# Patient Record
Sex: FEMALE | Race: WHITE | NOT HISPANIC OR LATINO | RURAL
[De-identification: names, ages, dates, MRNs, and addresses within clinical notes are randomized per-mention and may not be internally consistent; named-entity substitution may affect disease eponyms.]

---

## 2018-10-17 ENCOUNTER — OFFICE (OUTPATIENT)
Dept: RURAL CLINIC 3 | Facility: CLINIC | Age: 48
End: 2018-10-17
Payer: COMMERCIAL

## 2018-10-17 VITALS
DIASTOLIC BLOOD PRESSURE: 81 MMHG | HEART RATE: 121 BPM | HEIGHT: 66 IN | SYSTOLIC BLOOD PRESSURE: 142 MMHG | WEIGHT: 159 LBS

## 2018-10-17 DIAGNOSIS — F10.10 ALCOHOL ABUSE, UNCOMPLICATED: ICD-10-CM

## 2018-10-17 DIAGNOSIS — R10.10 UPPER ABDOMINAL PAIN, UNSPECIFIED: ICD-10-CM

## 2018-10-17 DIAGNOSIS — R94.5 ABNORMAL RESULTS OF LIVER FUNCTION STUDIES: ICD-10-CM

## 2018-10-17 DIAGNOSIS — R11.2 NAUSEA WITH VOMITING, UNSPECIFIED: ICD-10-CM

## 2018-10-17 DIAGNOSIS — K62.5 HEMORRHAGE OF ANUS AND RECTUM: ICD-10-CM

## 2018-10-17 DIAGNOSIS — R19.7 DIARRHEA, UNSPECIFIED: ICD-10-CM

## 2018-10-17 PROCEDURE — 99203 OFFICE O/P NEW LOW 30 MIN: CPT | Performed by: NURSE PRACTITIONER

## 2018-10-17 RX ORDER — HYOSCYAMINE SULFATE 0.12 MG/1
0.5 TABLET ORAL; SUBLINGUAL
Qty: 60 | Refills: 5 | Status: COMPLETED
Start: 2018-10-17 | End: 2019-02-20

## 2018-10-17 RX ORDER — PANTOPRAZOLE SODIUM 40 MG/1
40 TABLET, DELAYED RELEASE ORAL
Qty: 30 | Refills: 11 | Status: ACTIVE
Start: 2018-10-17

## 2018-12-11 ENCOUNTER — ON CAMPUS - OUTPATIENT (OUTPATIENT)
Dept: URBAN - METROPOLITAN AREA HOSPITAL 77 | Facility: HOSPITAL | Age: 48
End: 2018-12-11
Payer: COMMERCIAL

## 2018-12-11 DIAGNOSIS — R11.2 NAUSEA WITH VOMITING, UNSPECIFIED: ICD-10-CM

## 2018-12-11 DIAGNOSIS — K62.1 RECTAL POLYP: ICD-10-CM

## 2018-12-11 DIAGNOSIS — K62.5 HEMORRHAGE OF ANUS AND RECTUM: ICD-10-CM

## 2018-12-11 DIAGNOSIS — K64.8 OTHER HEMORRHOIDS: ICD-10-CM

## 2018-12-11 PROCEDURE — 45380 COLONOSCOPY AND BIOPSY: CPT | Performed by: INTERNAL MEDICINE

## 2018-12-11 PROCEDURE — 43235 EGD DIAGNOSTIC BRUSH WASH: CPT | Performed by: INTERNAL MEDICINE

## 2019-02-13 ENCOUNTER — HOSPITAL ENCOUNTER (OUTPATIENT)
Dept: OTHER | Facility: HOSPITAL | Age: 49
Discharge: HOME | End: 2019-02-13
Attending: NURSE PRACTITIONER

## 2019-02-20 ENCOUNTER — OFFICE (OUTPATIENT)
Dept: RURAL CLINIC 3 | Facility: CLINIC | Age: 49
End: 2019-02-20
Payer: COMMERCIAL

## 2019-02-20 VITALS
HEIGHT: 66 IN | WEIGHT: 157 LBS | SYSTOLIC BLOOD PRESSURE: 151 MMHG | DIASTOLIC BLOOD PRESSURE: 85 MMHG | HEART RATE: 114 BPM

## 2019-02-20 DIAGNOSIS — F10.10 ALCOHOL ABUSE, UNCOMPLICATED: ICD-10-CM

## 2019-02-20 DIAGNOSIS — K64.8 OTHER HEMORRHOIDS: ICD-10-CM

## 2019-02-20 DIAGNOSIS — R19.7 DIARRHEA, UNSPECIFIED: ICD-10-CM

## 2019-02-20 DIAGNOSIS — R94.5 ABNORMAL RESULTS OF LIVER FUNCTION STUDIES: ICD-10-CM

## 2019-02-20 PROCEDURE — 99214 OFFICE O/P EST MOD 30 MIN: CPT | Performed by: NURSE PRACTITIONER

## 2019-02-20 RX ORDER — COLESTIPOL HYDROCHLORIDE 1 G/1
4 TABLET, FILM COATED ORAL
Qty: 120 | Refills: 11 | Status: ACTIVE
Start: 2019-02-20

## 2019-04-12 ENCOUNTER — HOSPITAL ENCOUNTER (OUTPATIENT)
Dept: OTHER | Facility: HOSPITAL | Age: 49
Discharge: HOME | End: 2019-04-12
Attending: NURSE PRACTITIONER

## 2019-04-17 ENCOUNTER — OFFICE (OUTPATIENT)
Dept: URBAN - METROPOLITAN AREA CLINIC 64 | Facility: CLINIC | Age: 49
End: 2019-04-17
Payer: COMMERCIAL

## 2019-04-17 VITALS — HEIGHT: 66 IN

## 2019-04-17 DIAGNOSIS — K64.8 OTHER HEMORRHOIDS: ICD-10-CM

## 2019-04-17 PROCEDURE — 46221 LIGATION OF HEMORRHOID(S): CPT | Performed by: INTERNAL MEDICINE

## 2019-07-08 ENCOUNTER — OFFICE (OUTPATIENT)
Dept: URBAN - METROPOLITAN AREA CLINIC 64 | Facility: CLINIC | Age: 49
End: 2019-07-08
Payer: COMMERCIAL

## 2019-07-08 VITALS — HEIGHT: 66 IN

## 2019-07-08 DIAGNOSIS — K64.8 OTHER HEMORRHOIDS: ICD-10-CM

## 2019-07-08 PROCEDURE — 46221 LIGATION OF HEMORRHOID(S): CPT | Performed by: INTERNAL MEDICINE

## 2019-12-02 ENCOUNTER — HOSPITAL ENCOUNTER (OUTPATIENT)
Dept: OTHER | Facility: HOSPITAL | Age: 49
Discharge: HOME | End: 2019-12-02

## 2020-06-23 ENCOUNTER — HOSPITAL ENCOUNTER (OUTPATIENT)
Dept: OTHER | Facility: HOSPITAL | Age: 50
Discharge: HOME | End: 2020-06-23

## 2020-11-17 ENCOUNTER — TELEMEDICINE CONVERTED (OUTPATIENT)
Dept: GASTROENTEROLOGY | Facility: CLINIC | Age: 50
End: 2020-11-17
Attending: NURSE PRACTITIONER

## 2021-01-06 ENCOUNTER — HOSPITAL ENCOUNTER (OUTPATIENT)
Dept: OTHER | Facility: HOSPITAL | Age: 51
Discharge: HOME | End: 2021-01-06
Attending: NURSE PRACTITIONER

## 2021-01-20 ENCOUNTER — CONVERSION ENCOUNTER (OUTPATIENT)
Dept: ORTHOPEDIC SURGERY | Facility: CLINIC | Age: 51
End: 2021-01-20

## 2021-01-20 ENCOUNTER — OFFICE VISIT CONVERTED (OUTPATIENT)
Dept: ORTHOPEDIC SURGERY | Facility: CLINIC | Age: 51
End: 2021-01-20
Attending: ORTHOPAEDIC SURGERY

## 2021-02-05 ENCOUNTER — HOSPITAL ENCOUNTER (OUTPATIENT)
Dept: PREADMISSION TESTING | Facility: HOSPITAL | Age: 51
Discharge: HOME | End: 2021-02-05
Attending: INTERNAL MEDICINE

## 2021-02-06 LAB — SARS-COV-2 RNA SPEC QL NAA+PROBE: NOT DETECTED

## 2021-02-10 ENCOUNTER — HOSPITAL ENCOUNTER (OUTPATIENT)
Dept: GASTROENTEROLOGY | Facility: HOSPITAL | Age: 51
Setting detail: HOSPITAL OUTPATIENT SURGERY
Discharge: HOME | End: 2021-02-10
Attending: INTERNAL MEDICINE

## 2021-02-13 ENCOUNTER — HOSPITAL ENCOUNTER (OUTPATIENT)
Dept: PREADMISSION TESTING | Facility: HOSPITAL | Age: 51
Discharge: HOME | End: 2021-02-13
Attending: ORTHOPAEDIC SURGERY

## 2021-02-14 LAB — SARS-COV-2 RNA SPEC QL NAA+PROBE: NOT DETECTED

## 2021-02-18 ENCOUNTER — HOSPITAL ENCOUNTER (OUTPATIENT)
Dept: PERIOP | Facility: HOSPITAL | Age: 51
Setting detail: HOSPITAL OUTPATIENT SURGERY
Discharge: HOME | End: 2021-02-18
Attending: ORTHOPAEDIC SURGERY

## 2021-02-18 LAB
ALBUMIN SERPL-MCNC: 4.3 G/DL (ref 3.5–5)
ALBUMIN/GLOB SERPL: 1.3 {RATIO} (ref 1.4–2.6)
ALP SERPL-CCNC: 195 U/L (ref 42–98)
ALT SERPL-CCNC: 12 U/L (ref 10–40)
ANION GAP SERPL CALC-SCNC: 17 MMOL/L (ref 8–19)
APTT BLD: 25.6 S (ref 22.2–34.2)
AST SERPL-CCNC: 26 U/L (ref 15–50)
BASOPHILS # BLD AUTO: 0.05 10*3/UL (ref 0–0.2)
BASOPHILS NFR BLD AUTO: 0.8 % (ref 0–3)
BILIRUB SERPL-MCNC: 0.56 MG/DL (ref 0.2–1.3)
BUN SERPL-MCNC: 7 MG/DL (ref 5–25)
BUN/CREAT SERPL: 9 {RATIO} (ref 6–20)
CALCIUM SERPL-MCNC: 9.2 MG/DL (ref 8.7–10.4)
CHLORIDE SERPL-SCNC: 102 MMOL/L (ref 99–111)
CONV ABS IMM GRAN: 0.02 10*3/UL (ref 0–0.2)
CONV CO2: 21 MMOL/L (ref 22–32)
CONV IMMATURE GRAN: 0.3 % (ref 0–1.8)
CONV TOTAL PROTEIN: 7.6 G/DL (ref 6.3–8.2)
CREAT UR-MCNC: 0.77 MG/DL (ref 0.5–0.9)
DEPRECATED RDW RBC AUTO: 48.2 FL (ref 36.4–46.3)
EOSINOPHIL # BLD AUTO: 0.1 10*3/UL (ref 0–0.7)
EOSINOPHIL # BLD AUTO: 1.5 % (ref 0–7)
ERYTHROCYTE [DISTWIDTH] IN BLOOD BY AUTOMATED COUNT: 18.5 % (ref 11.7–14.4)
GFR SERPLBLD BASED ON 1.73 SQ M-ARVRAT: >60 ML/MIN/{1.73_M2}
GLOBULIN UR ELPH-MCNC: 3.3 G/DL (ref 2–3.5)
GLUCOSE SERPL-MCNC: 100 MG/DL (ref 65–99)
HCT VFR BLD AUTO: 34.2 % (ref 37–47)
HGB BLD-MCNC: 11.1 G/DL (ref 12–16)
LYMPHOCYTES # BLD AUTO: 1.82 10*3/UL (ref 1–5)
LYMPHOCYTES NFR BLD AUTO: 27.6 % (ref 20–45)
MCH RBC QN AUTO: 23.9 PG (ref 27–31)
MCHC RBC AUTO-ENTMCNC: 32.5 G/DL (ref 33–37)
MCV RBC AUTO: 73.7 FL (ref 81–99)
MONOCYTES # BLD AUTO: 0.6 10*3/UL (ref 0.2–1.2)
MONOCYTES NFR BLD AUTO: 9.1 % (ref 3–10)
NEUTROPHILS # BLD AUTO: 4 10*3/UL (ref 2–8)
NEUTROPHILS NFR BLD AUTO: 60.7 % (ref 30–85)
NRBC CBCN: 0 % (ref 0–0.7)
OSMOLALITY SERPL CALC.SUM OF ELEC: 280 MOSM/KG (ref 273–304)
PLATELET # BLD AUTO: 156 10*3/UL (ref 130–400)
PMV BLD AUTO: 9.2 FL (ref 9.4–12.3)
POTASSIUM SERPL-SCNC: 3.7 MMOL/L (ref 3.5–5.3)
RBC # BLD AUTO: 4.64 10*6/UL (ref 4.2–5.4)
SODIUM SERPL-SCNC: 136 MMOL/L (ref 135–147)
WBC # BLD AUTO: 6.59 10*3/UL (ref 4.8–10.8)

## 2021-03-05 ENCOUNTER — OFFICE VISIT CONVERTED (OUTPATIENT)
Dept: ORTHOPEDIC SURGERY | Facility: CLINIC | Age: 51
End: 2021-03-05
Attending: ORTHOPAEDIC SURGERY

## 2021-03-05 ENCOUNTER — CONVERSION ENCOUNTER (OUTPATIENT)
Dept: ORTHOPEDIC SURGERY | Facility: CLINIC | Age: 51
End: 2021-03-05

## 2021-03-24 ENCOUNTER — OFFICE VISIT CONVERTED (OUTPATIENT)
Dept: ORTHOPEDIC SURGERY | Facility: CLINIC | Age: 51
End: 2021-03-24
Attending: ORTHOPAEDIC SURGERY

## 2021-03-24 ENCOUNTER — CONVERSION ENCOUNTER (OUTPATIENT)
Dept: ORTHOPEDIC SURGERY | Facility: CLINIC | Age: 51
End: 2021-03-24

## 2021-04-05 ENCOUNTER — OFFICE VISIT CONVERTED (OUTPATIENT)
Dept: ORTHOPEDIC SURGERY | Facility: CLINIC | Age: 51
End: 2021-04-05

## 2021-05-10 ENCOUNTER — HOSPITAL ENCOUNTER (OUTPATIENT)
Dept: OTHER | Facility: HOSPITAL | Age: 51
Discharge: HOME | End: 2021-05-10
Attending: NURSE PRACTITIONER

## 2021-05-10 NOTE — H&P
History and Physical      Patient Name: Shane Sykes   Patient ID: [id number]   Sex: Female   YOB: 1970    Primary Care Provider: JASON LOZOYA   Referring Provider: JASON LOZOYA    Visit Date: January 20, 2021    Provider: Ben Verduzco MD   Location: [facility]   Location Address: [address] [place] [address]  [id number]   Location Phone: [phone]          Chief Complaint  · Right Shoulder Pain      History Of Present Illness  Shane Sykes is a 50 year old /White female who presents today to [facility].      Patient presents today for an evaluation of right shoulder pain. Patient thought it was originally her picc line causing her pain in her right shoulder. Patient was hospitalized and had fluid drawn off her back. When she was released from the hospital she caught COVID and was immediately hospitalized again. Patient had a fall from a ladder where she had immediate bruising in her shoulder. At that time she didn't feel pain but admits she was suffering from alcoholism and hardly felt pain at all. Patient states that she is currently 110 days sober and has a great support system at home. Patient has a history of cirrhosis.          Past Medical History  Asthma; Bladder Disorder; Hypertension; Left Shoulder Rotator Cuff Rim Rent Tear; Seasonal allergies; Thyroid disorder         Past Surgical History  Appendectomy; Colonoscopy; EGD         Medication List  aspirin 81 mg oral tablet,chewable; levothyroxine 125 mcg oral capsule; lisinopril 10 mg oral tablet; potassium chloride 20 mEq oral tablet extended release; tizanidine 4 mg oral tablet         Allergy List  NO KNOWN DRUG ALLERGIES       Allergies Reconciled  Family Medical History  - No Family History of Colorectal Cancer; *No Known Family History         Social History  Alcohol (Former); ; lives with children; Recreational Drug Use (Never); Tobacco (Current every day);  "Unemployed         Review of Systems  · Constitutional  o Denies  o : fever, chills, weight loss  · Cardiovascular  o Denies  o : chest pain, shortness of breath  · Gastrointestinal  o Denies  o : liver disease, heartburn, nausea, blood in stools  · Genitourinary  o Denies  o : painful urination, blood in urine  · Integument  o Denies  o : rash, itching  · Neurologic  o Denies  o : headache, weakness, loss of consciousness  · Musculoskeletal  o Denies  o : painful, swollen joints  · Psychiatric  o Denies  o : drug/alcohol addiction, anxiety, depression      Vitals  Date Time BP Position Site L\R Cuff Size HR RR TEMP (F) WT  HT  BMI kg/m2 BSA m2 O2 Sat FR L/min FiO2 HC       01/20/2021 02:10 PM      80 - R   140lbs 2oz 5'  6\" 22.62 1.72 100 %            Physical Examination  · Constitutional  o Appearance  o : well developed, well-nourished, no obvious deformities present  · Head and Face  o Head  o :   § Inspection  § : normocephalic  o Face  o :   § Inspection  § : no facial lesions  · Eyes  o Conjunctivae  o : conjunctivae normal  o Sclerae  o : sclerae white  · Ears, Nose, Mouth and Throat  o Ears  o :   § External Ears  § : appearance within normal limits  § Hearing  § : intact  o Nose  o :   § External Nose  § : appearance normal  · Neck  o Inspection/Palpation  o : normal appearance  o Range of Motion  o : full range of motion  · Respiratory  o Respiratory Effort  o : breathing unlabored  o Inspection of Chest  o : normal appearance  o Auscultation of Lungs  o : no audible wheezing or rales  · Cardiovascular  o Heart  o : regular rate  · Gastrointestinal  o Abdominal Examination  o : soft and non-tender  · Skin and Subcutaneous Tissue  o General Inspection  o : intact, no rashes  · Psychiatric  o General  o : Alert and oriented x3  o Judgement and Insight  o : judgment and insight intact  o Mood and Affect  o : mood normal, affect appropriate  · Right Shoulder  o Inspection  o : Sensation grossly intact. " Neurovascular intact. Skin intact. Forward flexion to 120 with pain. Pain with empty can testing. No swelling, skin discoloration or atrophy. Tender supraspinatus tendon. Tender biceps tendon. Good tone of deltoid, biceps, triceps, wrist extensors, and wrist flexors. Pain with cross body adduction. Pain with supination or pronation. Good flexion and extension of the elbow. IR to back pocket. Limited range of motion secondary to pain.   · Imaging  o Imaging  o : 1/6/21 MRI: 1. There is a full-thickness tear involving the distal middle fibers of the supraspinatus component of the cuff. There is partial retraction of the torn fibers by approximately 1.3 cm. 2. There is also evidence of partial thickness undersurface tear involving the remaining distal anterior and posterior fibers of the supraspinatus component. 3. No evidence for biceps tendon tear or labral tear.           Assessment  · Right shoulder pain, unspecified chronicity     719.41/M25.511  · Rotator cuff tear of right shoulder     840.4/M75.100      Plan  · Medications  o Medications have been Reconciled  o Transition of Care or Provider Policy  · Instructions  o Dr. Verduzco saw and examined the patient and agrees with plan.   o MRI reviewed by Dr. Verduzco.  o Reviewed the patient's Past Medical, Social, and Family history as well as the ROS at today's visit, no changes.  o Call or return if worsening symptoms.  o Discussed surgery.  o Risks/benefits discussed with patient including, but not limited to: infection, bleeding, neurovascular damage, malunion, nonunion, aesthetic deformity, need for further surgery, and death.  o Surgery pamphlet given.  o This note was transcribed by Fadia Case. marce  o Educated on risk of smoking related to procedure. Discussed options for smoking cessation.  o Discussed diagnosis and treatment options with the patient. Patient wishes to proceed with right shoulder scope. She states doesn't have any addiction issues with pain  medication or anything else, her only issue was alcohol which she is staying sober from. She has a great support system at home. Patient states she can't take Ibuprofen per her gastro doctor.            Electronically Signed by: Fadia Case-, Other -Author on January 21, 2021 03:28:07 PM  Electronically Co-signed by: Ben Verduzco MD -Reviewer on January 24, 2021 07:36:06 PM

## 2021-05-10 NOTE — H&P
"   History and Physical      Patient Name: Shane Sykes   Patient ID: [id number]   Sex: Female   YOB: 1970    Primary Care Provider: JASON LOZOYA   Referring Provider: JASON LOZOYA    Visit Date: November 17, 2020    Provider: DEVORA Gonzalez   [facility]   Location Address: [address] [place], [place] [address]  [id number]   Location Phone: [phone]          Chief Complaint  · Cirrhosis      History Of Present Illness  Video Conferencing Visit  Shane Sykes is a 50 year old /White female who is presenting for evaluation via video conferencing via [employer]. Verbal consent obtained before beginning visit.   The following staff were present during this visit: Mansi Arana MA   The patient is a 50 year old /White female, who presents on referral from JASON LOZOYA, for a gastroenterology evaluation for cirrhosis.      50-year-old female agrees to telehealth visit due to testing positive for COVID on 10/20/2020.  The patient used to drink a significant amount of alcohol.  She was drinking anywhere from 1 to 2 pints of whiskey per day.  She has been sober for 45 days.  Last month, she had a lot of bloating and swelling in her stomach.  She reports that she was \"talking crazy out of my head\" and her daughter thought she had a stroke.  She was taken to [facility], and then she was transferred to [facility] in [place].  At [facility], it was discovered that she had cirrhosis.  She reports she had 11 L drained over 5 paracentesis while in the hospital.  She was in the hospital for over a month.  She had lost 40 pounds during her stay.  She reports her ammonia levels were extremely high while in the hospital.  Upon discharge, she reports she is not had any abdominal swelling, pedal edema, or changes in mental cognition.  She is taking Aldactone 50 mg daily.  Her last colonoscopy she reports was in 2018.  She " does have a history of colon polyps, but there were none found on her last colonoscopy.  She has not had a recent EGD.  She denies melena hematemesis.       Labs 06/09/2020: AST 83, ALT 47, alk phos 379, creatinine 0.66, GFR greater than 60         Past Medical History  Asthma; Bladder Disorder; Hypertension; Left Shoulder Rotator Cuff Rim Rent Tear; Seasonal allergies; Thyroid disorder         Past Surgical History  Appendectomy; Colonoscopy; EGD         Medication List  Aldactone 50 mg oral tablet; aspirin 81 mg oral tablet,chewable; midodrine 10 mg oral tablet; Synthroid 150 mcg oral tablet; tizanidine 4 mg oral tablet         Allergy List  NO KNOWN DRUG ALLERGIES       Allergies Reconciled  Family Medical History  - No Family History of Colorectal Cancer; *No Known Family History         Social History  Alcohol (Former); ; lives with children; Recreational Drug Use (Never); Tobacco (Current every day); Unemployed         Review of Systems  · Constitutional  o Denies  o : chills, fever  · Eyes  o Denies  o : blurred vision, changes in vision  · Cardiovascular  o Denies  o : chest pain  · Respiratory  o Denies  o : shortness of breath  · Gastrointestinal  o Denies  o : nausea, vomiting  · Genitourinary  o Denies  o : dysuria, blood in urine  · Integument  o Denies  o : rash  · Neurologic  o Denies  o : tingling or numbness  · Musculoskeletal  o Denies  o : joint pain  · Endocrine  o Denies  o : weight gain, weight loss  · Psychiatric  o Denies  o : anxiety, depression      Physical Examination  · Constitutional  o Appearance  o : Well-nourished, well developed, alert, in no acute distress, alert and oriented X 3.  · Eyes  o Vision  o :   § Visual Fields  § : eyes move symmetrical in all directions  o Sclerae  o : sclerae anicteric  o Pupils and Irises  o : pupils equal and symetrical  · Neck  o Inspection/Palpation  o : Trachea is midline, no adneopathy  o Thyroid  o : Thyroid is not  enlarged  · Respiratory  o Respiratory Effort  o : Breathing is unlabored.  o Inspection of Chest  o : normal appearance, no retractions  · Genitourinary  o Digital Rectal Examination  o : Deferred  · Skin and Subcutaneous Tissue  o General Inspection  o : Skin is without focal lesions.   · Psychiatric  o Mood and Affect  o : No obvious depression.          Assessment  · Cirrhosis     571.5/K74.60      Plan  · Orders  o CBC with Auto Diff [facility] (64030) - 571.5/K74.60 - 11/17/2020  o CMP [facility] ([id number]) - 571.5/K74.60 - 11/17/2020  o Consent for Esophagogastroduodenoscopy (EGD) - Possible risks/complications, benefits, and alternatives to surgical or invasive procedure have been explained to patient and/or legal guardian. - Patient has been evaluated and can tolerate anesthesia and/or sedation. Risks, benefits, and alternatives to anesthesia and sedation have been explained to patient and/or legal guardian. (24556) - 571.5/K74.60 - 11/17/2020  o PT/INR (39400) - 571.5/K74.60 - 11/17/2020  o Alpha fetoprotein test (79214, 19560) - 571.5/K74.60 - 11/17/2020  o Ammonia level (43926) - 571.5/K74.60 - 11/17/2020  · Medications  o Medications have been Reconciled  o Transition of Care or Provider Policy  · Instructions  o 50-year-old female presenting today for a telehealth visit to establish a GI doctor. She has recently been diagnosed with cirrhosis. She has been doing quite well since her discharge from the hospital. She denies abdominal swelling, pedal edema, and changes in mental cognition. She is currently taking Aldactone 50 mg daily. I am going to order CBC, CMP, PT/INR, alpha-fetoprotein, and ammonia level. I will have her follow-up in 6 months to repeat those labs and a right upper quadrant ultrasound. We are going to get the patient scheduled for an EGD for surveillance of esophageal varices. The patient is agreeable to the plan.  o Electronically Identified Patient Education Materials Provided  Electronically            Electronically Signed by: DEVORA Gonzalez -Author on November 17, 2020 02:09:09 PM  Electronically Co-signed by: Mg Kumar MD -Reviewer on December 1, 2020 06:40:57 PM

## 2021-05-14 VITALS — BODY MASS INDEX: 21.57 KG/M2 | OXYGEN SATURATION: 99 % | HEART RATE: 109 BPM | HEIGHT: 66 IN | WEIGHT: 134.25 LBS

## 2021-05-14 VITALS — HEART RATE: 84 BPM | WEIGHT: 140 LBS | OXYGEN SATURATION: 97 % | BODY MASS INDEX: 22.5 KG/M2 | HEIGHT: 66 IN

## 2021-05-14 VITALS — HEIGHT: 66 IN | BODY MASS INDEX: 22.9 KG/M2 | OXYGEN SATURATION: 99 % | HEART RATE: 99 BPM | WEIGHT: 142.5 LBS

## 2021-05-14 VITALS — WEIGHT: 140.12 LBS | OXYGEN SATURATION: 100 % | BODY MASS INDEX: 22.52 KG/M2 | HEART RATE: 80 BPM | HEIGHT: 66 IN

## 2021-05-14 NOTE — PROGRESS NOTES
Progress Note      Patient Name: Shane Sykes   Patient ID: [id number]   Sex: Female   YOB: 1970    Primary Care Provider: JASON LOZOYA   Referring Provider: JASON LOZOYA    Visit Date: April 5, 2021    Provider: Bubba Farris PA-C   Location: [facility]   Location Address: [address] [place] [address]  [id number]   Location Phone: [phone]          Chief Complaint  · Right shoulder pain       History Of Present Illness  Shane Sykes is a 50 year old /White female who presents today to [facility].      Patient follows up today for right shoulder arthroscopy with mini open rotator cuff repair distal clavicle resection, and subacromial decompression performed 2/18/2021 by Dr. Kidd.  Patient reports pain has improved since the time of surgery.  Patient has been adherent to interventions and instructions.       Past Medical History  Asthma; Bladder disorder; Hypertension; Left Shoulder Rotator Cuff Rim Rent Tear; Seasonal allergies; Thyroid disorder         Past Surgical History  Appendectomy; Colonoscopy; EGD         Medication List  aspirin 81 mg oral tablet,chewable; levothyroxine 125 mcg oral capsule; lisinopril 10 mg oral tablet; Percocet 5-325 mg oral tablet; potassium chloride 20 mEq oral tablet extended release; tizanidine 4 mg oral tablet; Voltaren 1 % topical gel         Allergy List  NO KNOWN DRUG ALLERGIES         Family Medical History  - No Family History of Colorectal Cancer; *No Known Family History         Social History  Alcohol (Former); ; lives with children; Recreational Drug Use (Never); Tobacco (Current every day); Unemployed         Review of Systems  · Constitutional  o Denies  o : fever, chills, weight loss  · Cardiovascular  o Denies  o : chest pain, shortness of breath  · Gastrointestinal  o Denies  o : liver disease, heartburn, nausea, blood in stools  · Genitourinary  o Denies  o : painful  "urination, blood in urine  · Integument  o Denies  o : rash, itching  · Neurologic  o Denies  o : headache, weakness, loss of consciousness  · Musculoskeletal  o Denies  o : painful, swollen joints  · Psychiatric  o Denies  o : drug/alcohol addiction, anxiety, depression      Vitals  Date Time BP Position Site L\R Cuff Size HR RR TEMP (F) WT  HT  BMI kg/m2 BSA m2 O2 Sat FR L/min FiO2        04/05/2021 01:29 PM      109 - R   134lbs 4oz 5'  6\" 21.67 1.68 99 %            Physical Examination  · Constitutional  o Appearance  o : well developed, well-nourished, no obvious deformities present  · Head and Face  o Head  o :   § Inspection  § : normocephalic  o Face  o :   § Inspection  § : no facial lesions  · Eyes  o Conjunctivae  o : conjunctivae normal  o Sclerae  o : sclerae white  · Ears, Nose, Mouth and Throat  o Ears  o :   § External Ears  § : appearance within normal limits  § Hearing  § : intact  o Nose  o :   § External Nose  § : appearance normal  · Neck  o Inspection/Palpation  o : normal appearance  o Range of Motion  o : full range of motion  · Respiratory  o Respiratory Effort  o : breathing unlabored  o Inspection of Chest  o : normal appearance  o Auscultation of Lungs  o : no audible wheezing or rales  · Cardiovascular  o Heart  o : regular rate  · Gastrointestinal  o Abdominal Examination  o : soft and non-tender  · Skin and Subcutaneous Tissue  o General Inspection  o : intact, no rashes  · Psychiatric  o General  o : Alert and oriented x3  o Judgement and Insight  o : judgment and insight intact  o Mood and Affect  o : mood normal, affect appropriate  · Right Shoulder  o Inspection  o : Surgical sites are appreciated to be healing appropriately with good scar formation that is supple. Pain limited P ROM compared to contralateral side. Neurovascularly intact distally.          Assessment  · Right shoulder pain, unspecified chronicity     719.41/M25.511  · Surgical aftercare, musculoskeletal " system     V58.78/Z47.89  · Decreased range of motion of shoulder, right     719.51/M25.611      Plan  · Medications  o Medications have been Reconciled  o Transition of Care or Provider Policy  · Instructions  o Reviewed the patient's Past Medical, Social, and Family history as well as the ROS at today's visit, no changes.  o Call or return if worsening symptoms.  o Patient is 6 weeks post op. Patient educated to focus on ROM, no heavy lifting or push/pull, and is to discontinue sling and bolster. Patient to follow up in 6 weeks.  o Portions of this note were generated with voice recognition software. While efforts have been made to proofread the text, some sound alike errors may still persist.             Electronically Signed by: Bubba Farris PA-C -Author on April 11, 2021 12:01:18 PM

## 2021-05-14 NOTE — PROGRESS NOTES
Progress Note      Patient Name: Shane Sykes   Patient ID: [id number]   Sex: Female   YOB: 1970    Primary Care Provider: JASON LOZOYA   Referring Provider: JASON LOZOYA    Visit Date: March 24, 2021    Provider: Ben Verduzco MD   Location: [facility]   Location Address: [address] [place] [address]  [id number]   Location Phone: [phone]          Chief Complaint  · Right Shoulder Pain      History Of Present Illness  Shane Sykes is a 50 year old /White female who presents today to [facility].      Patient is s/p Right Shoulder Arthroscopy, Subacromial Decompression, Distal Claviculectomy, Rotator Cuff Repair performed on 2/18/21. She just began physical therapy yesterday and states they are doing well for the one time she has gone. She states pain due to stiffness today. She presents today in a sling.       Past Medical History  Asthma; Bladder disorder; Hypertension; Left Shoulder Rotator Cuff Rim Rent Tear; Seasonal allergies; Thyroid disorder         Past Surgical History  Appendectomy; Colonoscopy; EGD         Medication List  aspirin 81 mg oral tablet,chewable; levothyroxine 125 mcg oral capsule; lisinopril 10 mg oral tablet; Norco 7.5-325 mg oral tablet; potassium chloride 20 mEq oral tablet extended release; tizanidine 4 mg oral tablet         Allergy List  NO KNOWN DRUG ALLERGIES       Allergies Reconciled  Family Medical History  - No Family History of Colorectal Cancer; *No Known Family History         Social History  Alcohol (Former); ; lives with children; Recreational Drug Use (Never); Tobacco (Current every day); Unemployed         Review of Systems  · Constitutional  o Denies  o : fever, chills, weight loss  · Cardiovascular  o Denies  o : chest pain, shortness of breath  · Gastrointestinal  o Denies  o : liver disease, heartburn, nausea, blood in stools  · Genitourinary  o Denies  o : painful urination,  "blood in urine  · Integument  o Denies  o : rash, itching  · Neurologic  o Denies  o : headache, weakness, loss of consciousness  · Musculoskeletal  o Denies  o : painful, swollen joints  · Psychiatric  o Denies  o : drug/alcohol addiction, anxiety, depression      Vitals  Date Time BP Position Site L\R Cuff Size HR RR TEMP (F) WT  HT  BMI kg/m2 BSA m2 O2 Sat FR L/min FiO2        03/24/2021 10:07 AM      99 - R   142lbs 8oz 5'  6\" 23 1.73 99 %            Physical Examination  · Constitutional  o Appearance  o : well developed, well-nourished, no obvious deformities present  · Head and Face  o Head  o :   § Inspection  § : normocephalic  o Face  o :   § Inspection  § : no facial lesions  · Eyes  o Conjunctivae  o : conjunctivae normal  o Sclerae  o : sclerae white  · Ears, Nose, Mouth and Throat  o Ears  o :   § External Ears  § : appearance within normal limits  § Hearing  § : intact  o Nose  o :   § External Nose  § : appearance normal  · Neck  o Inspection/Palpation  o : normal appearance  o Range of Motion  o : full range of motion  · Respiratory  o Respiratory Effort  o : breathing unlabored  o Inspection of Chest  o : normal appearance  o Auscultation of Lungs  o : no audible wheezing or rales  · Cardiovascular  o Heart  o : regular rate  · Gastrointestinal  o Abdominal Examination  o : soft and non-tender  · Skin and Subcutaneous Tissue  o General Inspection  o : intact, no rashes  · Psychiatric  o General  o : Alert and oriented x3  o Judgement and Insight  o : judgment and insight intact  o Mood and Affect  o : mood normal, affect appropriate  · Right Shoulder  o Inspection  o : Sensation grossly intact. Neurovascular intact. Mild redness around incision. Incisions well healing with no signs of infection. Skin intact. Tenderness around incision. Pulses are pleasant.           Assessment  · Aftercare following Right Shoulder Arthroscopy, Subacromial Decompression, Distal Claviculectomy, Rotator Cuff " Repair     V54.81  · Left shoulder pain, unspecified chronicity     719.41/M25.512      Plan  · Medications  o Medications have been Reconciled  o Transition of Care or Provider Policy  · Instructions  o Dr. Verduzco saw and examined the patient and agrees with plan.   o Reviewed the patient's Past Medical, Social, and Family history as well as the ROS at today's visit, no changes.  o Call or return if worsening symptoms.  o Follow up in 1 month.  o Discussed treatment plans with the patient. She will continue physical therapy and doing at home exercises that we demonstrated in todays visit. She will being to wean herself out the sling.  o The above service was scribed by Fadia Case on my behalf and I attest to the accuracy of the note. marce             Electronically Signed by: Fadia Case-, Other -Author on March 25, 2021 09:31:53 AM  Electronically Co-signed by: Ben Verduzco MD -Reviewer on March 28, 2021 01:40:18 PM

## 2021-05-14 NOTE — PROGRESS NOTES
Progress Note      Patient Name: Shane Sykes   Patient ID: [id number]   Sex: Female   YOB: 1970    Primary Care Provider: JASON LOZOYA   Referring Provider: JASON LOZOYA    Visit Date: March 5, 2021    Provider: Ben Verduzco MD   Location: [facility]   Location Address: [address] [place] [address]  [id number]   Location Phone: [phone]          Chief Complaint  · Right Shoulder Arthroscopy      History Of Present Illness  Shane Sykes is a 50 year old /White female who presents today to [facility]. Patient is following up for her right rotator cuff repair. She is doing pretty well. She is two weeks out.       Past Medical History  Asthma; Bladder disorder; Hypertension; Left Shoulder Rotator Cuff Rim Rent Tear; Seasonal allergies; Thyroid disorder         Past Surgical History  Appendectomy; Colonoscopy; EGD         Medication List  aspirin 81 mg oral tablet,chewable; levothyroxine 125 mcg oral capsule; lisinopril 10 mg oral tablet; Norco 7.5-325 mg oral tablet; potassium chloride 20 mEq oral tablet extended release; tizanidine 4 mg oral tablet         Allergy List  NO KNOWN DRUG ALLERGIES         Family Medical History  - No Family History of Colorectal Cancer; *No Known Family History         Social History  Alcohol (Former); ; lives with children; Recreational Drug Use (Never); Tobacco (Current every day); Unemployed         Review of Systems  · Constitutional  o Denies  o : fever, chills, weight loss  · Cardiovascular  o Denies  o : chest pain, shortness of breath  · Gastrointestinal  o Denies  o : liver disease, heartburn, nausea, blood in stools  · Genitourinary  o Denies  o : painful urination, blood in urine  · Integument  o Denies  o : rash, itching  · Neurologic  o Denies  o : headache, weakness, loss of consciousness  · Musculoskeletal  o Denies  o : painful, swollen joints  · Psychiatric  o Denies  o :  "drug/alcohol addiction, anxiety, depression      Vitals  Date Time BP Position Site L\R Cuff Size HR RR TEMP (F) WT  HT  BMI kg/m2 BSA m2 O2 Sat FR L/min FiO2 HC       03/05/2021 03:19 PM      84 - R   140lbs 0oz 5'  6\" 22.6 1.72 97 %            Physical Examination  · Constitutional  o Appearance  o : well developed, well-nourished, no obvious deformities present  · Head and Face  o Head  o :   § Inspection  § : normocephalic  o Face  o :   § Inspection  § : no facial lesions  · Eyes  o Conjunctivae  o : conjunctivae normal  o Sclerae  o : sclerae white  · Ears, Nose, Mouth and Throat  o Ears  o :   § External Ears  § : appearance within normal limits  § Hearing  § : intact  o Nose  o :   § External Nose  § : appearance normal  · Neck  o Inspection/Palpation  o : normal appearance  o Range of Motion  o : full range of motion  · Respiratory  o Respiratory Effort  o : breathing unlabored  o Inspection of Chest  o : normal appearance  o Auscultation of Lungs  o : no audible wheezing or rales  · Cardiovascular  o Heart  o : regular rate  · Gastrointestinal  o Abdominal Examination  o : soft and non-tender  · Skin and Subcutaneous Tissue  o General Inspection  o : intact, no rashes  · Psychiatric  o General  o : Alert and oriented x3  o Judgement and Insight  o : judgment and insight intact  o Mood and Affect  o : mood normal, affect appropriate  · Right Shoulder  o Inspection  o : Incisions well-healed. No signs of infection. She has forward flexion to about 90 degrees passively. Neurovascularly intact upper extremity.           Assessment  · Right shoulder rotator cuff repair-Aftercare following surgery of the muskuloskeletal system     V54.81  · Right shoulder pain, unspecified chronicity     719.41/M25.511      Plan  · Medications  o Medications have been Reconciled  o Transition of Care or Provider Policy  · Instructions  o Reviewed the patient's Past Medical, Social, and Family history as well as the ROS at " today's visit, no changes.  o Call or return if worsening symptoms.  o This note is transcribed by Torri zheng  o Start therapy in 2 weeks. Give her some more passive range of motion exercises to do.             Electronically Signed by: Torri Berg, -Author on March 9, 2021 11:10:48 AM  Electronically Co-signed by: Ben Verduzco MD -Reviewer on March 14, 2021 05:53:31 PM

## 2021-05-17 ENCOUNTER — OFFICE VISIT CONVERTED (OUTPATIENT)
Dept: ORTHOPEDIC SURGERY | Facility: CLINIC | Age: 51
End: 2021-05-17
Attending: PHYSICIAN ASSISTANT

## 2021-06-05 NOTE — PROGRESS NOTES
Progress Note      Patient Name: Shane Sykes   Patient ID: [id number]   Sex: Female   YOB: 1970    Primary Care Provider: JASON LOZOYA   Referring Provider: JASON LOZOYA    Visit Date: May 17, 2021    Provider: Bubba Farris PA-C   Location: [facility]   Location Address: [address] [place] [address]  [id number]   Location Phone: [phone]          Chief Complaint  · Follow up right shoulder arthroscopy      History Of Present Illness  Shane Sykes is a 50 year old /White female who presents today to [facility].      Patient follows up today for right shoulder arthroscopy with mini open RCR, distal clavicle resection, subacromial decompression performed 2/18/2021 by Dr. Verduzco.  Patient reports pain has improved since the time of surgery.  Patient has been adherent to interventions and instructions.    Patient reports that she anticipates moving shortly to [place].       Past Medical History  Asthma; Bladder disorder; Hypertension; Left Shoulder Rotator Cuff Rim Rent Tear; Seasonal allergies; Thyroid disorder         Past Surgical History  Appendectomy; Colonoscopy; EGD         Medication List  aspirin 81 mg oral tablet,chewable; levothyroxine 125 mcg oral capsule; lisinopril 10 mg oral tablet; meloxicam 7.5 mg oral tablet; potassium chloride 20 mEq oral tablet extended release; tizanidine 4 mg oral tablet; Ultram 50 mg oral tablet; Voltaren 1 % topical gel         Allergy List  NO KNOWN DRUG ALLERGIES         Family Medical History  - No Family History of Colorectal Cancer; *No Known Family History         Social History  Alcohol (Former); ; lives with children; Recreational Drug Use (Never); Tobacco (Current every day); Unemployed         Review of Systems  · Constitutional  o Denies  o : fever, chills, weight loss  · Cardiovascular  o Denies  o : chest pain, shortness of breath  · Gastrointestinal  o Denies  o : liver  "disease, heartburn, nausea, blood in stools  · Genitourinary  o Denies  o : painful urination, blood in urine  · Integument  o Denies  o : rash, itching  · Neurologic  o Denies  o : headache, weakness, loss of consciousness  · Musculoskeletal  o Denies  o : painful, swollen joints  · Psychiatric  o Denies  o : drug/alcohol addiction, anxiety, depression      Vitals  Date Time BP Position Site L\R Cuff Size HR RR TEMP (F) WT  HT  BMI kg/m2 BSA m2 O2 Sat FR L/min FiO2        05/17/2021 01:43 PM      96 - R   135lbs 0oz 5'  6\" 21.79 1.69 98 %            Physical Examination  · Constitutional  o Appearance  o : well developed, well-nourished, no obvious deformities present  · Head and Face  o Head  o :   § Inspection  § : normocephalic  o Face  o :   § Inspection  § : no facial lesions  · Eyes  o Conjunctivae  o : conjunctivae normal  o Sclerae  o : sclerae white  · Ears, Nose, Mouth and Throat  o Ears  o :   § External Ears  § : appearance within normal limits  § Hearing  § : intact  o Nose  o :   § External Nose  § : appearance normal  · Neck  o Inspection/Palpation  o : normal appearance  o Range of Motion  o : full range of motion  · Respiratory  o Respiratory Effort  o : breathing unlabored  o Inspection of Chest  o : normal appearance  o Auscultation of Lungs  o : no audible wheezing or rales  · Cardiovascular  o Heart  o : regular rate  · Gastrointestinal  o Abdominal Examination  o : soft and non-tender  · Skin and Subcutaneous Tissue  o General Inspection  o : intact, no rashes  · Psychiatric  o General  o : Alert and oriented x3  o Judgement and Insight  o : judgment and insight intact  o Mood and Affect  o : mood normal, affect appropriate  · Right Shoulder  o Inspection  o : Surgical sites appreciated to be healing appropriately with good scar formation that is supple. AROM is 120 degrees of forward flexion 135 degrees active assist, 135 degrees of abduction, 60 degrees of external rotation and internal " rotation to the subsacral region. Neurovascularly intact distally.          Assessment  · Right Aftercare following surgery of the muskuloskeletal system     V54.81  · Decreased range of motion of right shoulder     719.51/M25.611      Plan  · Instructions  o Reviewed the patient's Past Medical, Social, and Family history as well as the ROS at today's visit, no changes.  o Call or return if worsening symptoms.  o Patient to continue with physical therapy at this time and is to follow-up in 6 weeks to assess improvement with ROM. Patient may continue to progressively return to all of her regular activities as discussed in clinic today.  o Portions of this note were generated with voice recognition software. While efforts have been made to proofread the text, some sound alike errors may still persist.             Electronically Signed by: CATIA Ignacio-JESSEE -Author on May 17, 2021 05:20:05 PM  Electronically Co-signed by: Ben Verduzco MD -Reviewer on May 18, 2021 04:15:52 PM

## 2021-06-29 PROBLEM — F41.9 ANXIETY: Status: ACTIVE | Noted: 2017-10-17

## 2021-06-29 PROBLEM — E78.5 HYPERLIPIDEMIA: Status: ACTIVE | Noted: 2019-04-11

## 2021-06-29 PROBLEM — F10.20 ALCOHOLISM (HCC): Status: ACTIVE | Noted: 2018-04-26

## 2021-06-29 PROBLEM — E07.9 THYROID DISORDER: Status: ACTIVE | Noted: 2019-04-11

## 2021-06-29 PROBLEM — N32.9 BLADDER DISORDER: Status: ACTIVE | Noted: 2021-06-29

## 2021-06-29 PROBLEM — J30.2 SEASONAL ALLERGIC RHINITIS: Status: ACTIVE | Noted: 2021-06-29

## 2021-07-15 VITALS — BODY MASS INDEX: 21.69 KG/M2 | HEIGHT: 66 IN | OXYGEN SATURATION: 98 % | WEIGHT: 135 LBS | HEART RATE: 96 BPM
